# Patient Record
Sex: MALE | Race: WHITE | NOT HISPANIC OR LATINO
[De-identification: names, ages, dates, MRNs, and addresses within clinical notes are randomized per-mention and may not be internally consistent; named-entity substitution may affect disease eponyms.]

---

## 2018-06-05 PROBLEM — Z00.00 ENCOUNTER FOR PREVENTIVE HEALTH EXAMINATION: Status: ACTIVE | Noted: 2018-06-05

## 2018-06-08 ENCOUNTER — APPOINTMENT (OUTPATIENT)
Dept: PSYCHIATRY | Facility: CLINIC | Age: 18
End: 2018-06-08
Payer: COMMERCIAL

## 2018-06-08 PROCEDURE — 99205 OFFICE O/P NEW HI 60 MIN: CPT

## 2018-06-08 RX ORDER — HYDROCORTISONE 25 MG/G
2.5 CREAM TOPICAL
Qty: 2835 | Refills: 0 | Status: ACTIVE | COMMUNITY
Start: 2018-04-18

## 2018-06-11 LAB
ALBUMIN SERPL ELPH-MCNC: 4.7 G/DL
ALP BLD-CCNC: 53 U/L
ALT SERPL-CCNC: 14 U/L
ANION GAP SERPL CALC-SCNC: 14 MMOL/L
AST SERPL-CCNC: 21 U/L
BASOPHILS # BLD AUTO: 0.03 K/UL
BASOPHILS NFR BLD AUTO: 0.4 %
BILIRUB SERPL-MCNC: 0.5 MG/DL
BUN SERPL-MCNC: 14 MG/DL
CALCIUM SERPL-MCNC: 9.9 MG/DL
CHLORIDE SERPL-SCNC: 103 MMOL/L
CHOLEST SERPL-MCNC: 111 MG/DL
CHOLEST/HDLC SERPL: 2.6 RATIO
CO2 SERPL-SCNC: 26 MMOL/L
CREAT SERPL-MCNC: 1.03 MG/DL
EOSINOPHIL # BLD AUTO: 0.06 K/UL
EOSINOPHIL NFR BLD AUTO: 0.9 %
GLUCOSE SERPL-MCNC: 85 MG/DL
HBA1C MFR BLD HPLC: 4.8 %
HCT VFR BLD CALC: 46.5 %
HDLC SERPL-MCNC: 43 MG/DL
HGB BLD-MCNC: 15.7 G/DL
IMM GRANULOCYTES NFR BLD AUTO: 0.1 %
LDLC SERPL CALC-MCNC: 60 MG/DL
LYMPHOCYTES # BLD AUTO: 1.23 K/UL
LYMPHOCYTES NFR BLD AUTO: 17.5 %
MAN DIFF?: NORMAL
MCHC RBC-ENTMCNC: 31.2 PG
MCHC RBC-ENTMCNC: 33.8 GM/DL
MCV RBC AUTO: 92.4 FL
MONOCYTES # BLD AUTO: 0.53 K/UL
MONOCYTES NFR BLD AUTO: 7.5 %
NEUTROPHILS # BLD AUTO: 5.17 K/UL
NEUTROPHILS NFR BLD AUTO: 73.6 %
PLATELET # BLD AUTO: 277 K/UL
POTASSIUM SERPL-SCNC: 4.7 MMOL/L
PROT SERPL-MCNC: 7.4 G/DL
RBC # BLD: 5.03 M/UL
RBC # FLD: 12.6 %
SODIUM SERPL-SCNC: 143 MMOL/L
TRIGL SERPL-MCNC: 41 MG/DL
TSH SERPL-ACNC: 0.83 UIU/ML
WBC # FLD AUTO: 7.03 K/UL

## 2018-06-18 LAB — DRUG ABUSE PANEL-9, SERUM: NORMAL

## 2018-06-20 ENCOUNTER — APPOINTMENT (OUTPATIENT)
Dept: PSYCHIATRY | Facility: CLINIC | Age: 18
End: 2018-06-20
Payer: COMMERCIAL

## 2018-06-20 PROCEDURE — 99214 OFFICE O/P EST MOD 30 MIN: CPT

## 2018-07-13 ENCOUNTER — APPOINTMENT (OUTPATIENT)
Dept: PSYCHIATRY | Facility: CLINIC | Age: 18
End: 2018-07-13
Payer: COMMERCIAL

## 2018-07-13 PROCEDURE — 99214 OFFICE O/P EST MOD 30 MIN: CPT

## 2018-07-13 RX ORDER — QUETIAPINE FUMARATE 50 MG/1
50 TABLET ORAL
Qty: 30 | Refills: 0 | Status: DISCONTINUED | COMMUNITY
Start: 2018-06-08

## 2018-08-10 ENCOUNTER — APPOINTMENT (OUTPATIENT)
Dept: PSYCHIATRY | Facility: CLINIC | Age: 18
End: 2018-08-10
Payer: COMMERCIAL

## 2018-08-10 PROCEDURE — 99214 OFFICE O/P EST MOD 30 MIN: CPT

## 2018-09-21 ENCOUNTER — APPOINTMENT (OUTPATIENT)
Dept: PSYCHIATRY | Facility: CLINIC | Age: 18
End: 2018-09-21
Payer: COMMERCIAL

## 2018-09-21 PROCEDURE — 99214 OFFICE O/P EST MOD 30 MIN: CPT

## 2018-09-21 RX ORDER — SERTRALINE HYDROCHLORIDE 50 MG/1
50 TABLET, FILM COATED ORAL
Qty: 30 | Refills: 1 | Status: COMPLETED | COMMUNITY
Start: 2018-07-13 | End: 2018-09-21

## 2018-11-21 ENCOUNTER — APPOINTMENT (OUTPATIENT)
Dept: PSYCHIATRY | Facility: CLINIC | Age: 18
End: 2018-11-21

## 2018-11-29 ENCOUNTER — RX RENEWAL (OUTPATIENT)
Age: 18
End: 2018-11-29

## 2018-12-05 ENCOUNTER — APPOINTMENT (OUTPATIENT)
Dept: PSYCHIATRY | Facility: CLINIC | Age: 18
End: 2018-12-05
Payer: COMMERCIAL

## 2018-12-05 PROCEDURE — 99214 OFFICE O/P EST MOD 30 MIN: CPT

## 2019-02-04 ENCOUNTER — RX RENEWAL (OUTPATIENT)
Age: 19
End: 2019-02-04

## 2019-02-27 ENCOUNTER — APPOINTMENT (OUTPATIENT)
Dept: PSYCHIATRY | Facility: CLINIC | Age: 19
End: 2019-02-27
Payer: COMMERCIAL

## 2019-02-27 PROCEDURE — 99214 OFFICE O/P EST MOD 30 MIN: CPT

## 2019-03-22 ENCOUNTER — APPOINTMENT (OUTPATIENT)
Dept: PSYCHIATRY | Facility: CLINIC | Age: 19
End: 2019-03-22
Payer: COMMERCIAL

## 2019-03-22 PROCEDURE — 99214 OFFICE O/P EST MOD 30 MIN: CPT

## 2019-04-19 ENCOUNTER — APPOINTMENT (OUTPATIENT)
Dept: PSYCHIATRY | Facility: CLINIC | Age: 19
End: 2019-04-19

## 2019-05-15 ENCOUNTER — APPOINTMENT (OUTPATIENT)
Dept: PSYCHIATRY | Facility: CLINIC | Age: 19
End: 2019-05-15
Payer: COMMERCIAL

## 2019-05-15 PROCEDURE — 99214 OFFICE O/P EST MOD 30 MIN: CPT

## 2019-07-31 ENCOUNTER — RX RENEWAL (OUTPATIENT)
Age: 19
End: 2019-07-31

## 2019-08-26 ENCOUNTER — APPOINTMENT (OUTPATIENT)
Dept: PSYCHIATRY | Facility: CLINIC | Age: 19
End: 2019-08-26
Payer: COMMERCIAL

## 2019-08-26 PROCEDURE — 99214 OFFICE O/P EST MOD 30 MIN: CPT

## 2019-11-27 ENCOUNTER — APPOINTMENT (OUTPATIENT)
Dept: PSYCHIATRY | Facility: CLINIC | Age: 19
End: 2019-11-27
Payer: COMMERCIAL

## 2019-11-27 PROCEDURE — 99214 OFFICE O/P EST MOD 30 MIN: CPT

## 2019-12-31 ENCOUNTER — RX RENEWAL (OUTPATIENT)
Age: 19
End: 2019-12-31

## 2020-03-19 ENCOUNTER — APPOINTMENT (OUTPATIENT)
Dept: PSYCHIATRY | Facility: CLINIC | Age: 20
End: 2020-03-19
Payer: COMMERCIAL

## 2020-03-19 PROCEDURE — 99442: CPT

## 2020-03-19 RX ORDER — QUETIAPINE FUMARATE 100 MG/1
100 TABLET ORAL
Qty: 30 | Refills: 0 | Status: COMPLETED | COMMUNITY
Start: 2019-12-31 | End: 2020-03-19

## 2020-04-17 ENCOUNTER — APPOINTMENT (OUTPATIENT)
Dept: PSYCHIATRY | Facility: CLINIC | Age: 20
End: 2020-04-17
Payer: COMMERCIAL

## 2020-04-17 PROCEDURE — 99442: CPT

## 2020-07-24 ENCOUNTER — APPOINTMENT (OUTPATIENT)
Dept: PSYCHIATRY | Facility: CLINIC | Age: 20
End: 2020-07-24
Payer: COMMERCIAL

## 2020-07-24 PROCEDURE — 99214 OFFICE O/P EST MOD 30 MIN: CPT | Mod: 95

## 2020-09-12 ENCOUNTER — TRANSCRIPTION ENCOUNTER (OUTPATIENT)
Age: 20
End: 2020-09-12

## 2020-10-16 ENCOUNTER — APPOINTMENT (OUTPATIENT)
Dept: PSYCHIATRY | Facility: CLINIC | Age: 20
End: 2020-10-16
Payer: COMMERCIAL

## 2020-10-16 PROCEDURE — 99214 OFFICE O/P EST MOD 30 MIN: CPT

## 2020-12-11 ENCOUNTER — APPOINTMENT (OUTPATIENT)
Dept: PSYCHIATRY | Facility: CLINIC | Age: 20
End: 2020-12-11
Payer: COMMERCIAL

## 2020-12-11 PROCEDURE — 99214 OFFICE O/P EST MOD 30 MIN: CPT

## 2020-12-11 PROCEDURE — 99072 ADDL SUPL MATRL&STAF TM PHE: CPT

## 2021-02-12 ENCOUNTER — TRANSCRIPTION ENCOUNTER (OUTPATIENT)
Age: 21
End: 2021-02-12

## 2021-02-22 ENCOUNTER — TRANSCRIPTION ENCOUNTER (OUTPATIENT)
Age: 21
End: 2021-02-22

## 2021-03-12 ENCOUNTER — APPOINTMENT (OUTPATIENT)
Dept: PSYCHIATRY | Facility: CLINIC | Age: 21
End: 2021-03-12
Payer: COMMERCIAL

## 2021-03-12 PROCEDURE — 99214 OFFICE O/P EST MOD 30 MIN: CPT

## 2021-03-12 PROCEDURE — 99072 ADDL SUPL MATRL&STAF TM PHE: CPT

## 2021-03-12 RX ORDER — PANTOPRAZOLE 40 MG/1
40 TABLET, DELAYED RELEASE ORAL
Refills: 0 | Status: ACTIVE | COMMUNITY
Start: 2021-03-12

## 2021-04-14 ENCOUNTER — APPOINTMENT (OUTPATIENT)
Dept: PSYCHIATRY | Facility: CLINIC | Age: 21
End: 2021-04-14
Payer: COMMERCIAL

## 2021-04-14 PROCEDURE — 99214 OFFICE O/P EST MOD 30 MIN: CPT | Mod: 95

## 2021-06-04 ENCOUNTER — APPOINTMENT (OUTPATIENT)
Dept: PSYCHIATRY | Facility: CLINIC | Age: 21
End: 2021-06-04
Payer: COMMERCIAL

## 2021-06-04 PROCEDURE — 99214 OFFICE O/P EST MOD 30 MIN: CPT | Mod: 95

## 2021-08-06 ENCOUNTER — APPOINTMENT (OUTPATIENT)
Dept: PSYCHIATRY | Facility: CLINIC | Age: 21
End: 2021-08-06
Payer: COMMERCIAL

## 2021-08-06 PROCEDURE — 99214 OFFICE O/P EST MOD 30 MIN: CPT | Mod: 95

## 2021-10-01 ENCOUNTER — APPOINTMENT (OUTPATIENT)
Dept: PSYCHIATRY | Facility: CLINIC | Age: 21
End: 2021-10-01
Payer: COMMERCIAL

## 2021-10-01 PROCEDURE — 99214 OFFICE O/P EST MOD 30 MIN: CPT | Mod: 95

## 2021-10-06 ENCOUNTER — TRANSCRIPTION ENCOUNTER (OUTPATIENT)
Age: 21
End: 2021-10-06

## 2021-10-20 ENCOUNTER — TRANSCRIPTION ENCOUNTER (OUTPATIENT)
Age: 21
End: 2021-10-20

## 2021-11-03 ENCOUNTER — TRANSCRIPTION ENCOUNTER (OUTPATIENT)
Age: 21
End: 2021-11-03

## 2021-12-07 ENCOUNTER — TRANSCRIPTION ENCOUNTER (OUTPATIENT)
Age: 21
End: 2021-12-07

## 2021-12-17 ENCOUNTER — APPOINTMENT (OUTPATIENT)
Dept: PSYCHIATRY | Facility: CLINIC | Age: 21
End: 2021-12-17
Payer: COMMERCIAL

## 2021-12-17 PROCEDURE — 99214 OFFICE O/P EST MOD 30 MIN: CPT | Mod: 95

## 2022-01-14 ENCOUNTER — APPOINTMENT (OUTPATIENT)
Dept: PSYCHIATRY | Facility: CLINIC | Age: 22
End: 2022-01-14
Payer: COMMERCIAL

## 2022-01-14 PROCEDURE — 99214 OFFICE O/P EST MOD 30 MIN: CPT | Mod: 95

## 2022-01-18 ENCOUNTER — TRANSCRIPTION ENCOUNTER (OUTPATIENT)
Age: 22
End: 2022-01-18

## 2022-03-11 ENCOUNTER — APPOINTMENT (OUTPATIENT)
Dept: PSYCHIATRY | Facility: CLINIC | Age: 22
End: 2022-03-11
Payer: COMMERCIAL

## 2022-03-11 ENCOUNTER — NON-APPOINTMENT (OUTPATIENT)
Age: 22
End: 2022-03-11

## 2022-03-11 PROCEDURE — 99214 OFFICE O/P EST MOD 30 MIN: CPT | Mod: 95

## 2022-03-11 RX ORDER — ESCITALOPRAM OXALATE 5 MG/1
5 TABLET ORAL
Qty: 90 | Refills: 0 | Status: DISCONTINUED | COMMUNITY
Start: 2021-03-12 | End: 2022-03-11

## 2022-06-03 ENCOUNTER — APPOINTMENT (OUTPATIENT)
Dept: PSYCHIATRY | Facility: CLINIC | Age: 22
End: 2022-06-03
Payer: COMMERCIAL

## 2022-06-03 PROCEDURE — 99214 OFFICE O/P EST MOD 30 MIN: CPT | Mod: 95

## 2022-06-03 RX ORDER — MELOXICAM 7.5 MG/1
7.5 TABLET ORAL
Qty: 14 | Refills: 0 | Status: DISCONTINUED | COMMUNITY
Start: 2022-04-14

## 2022-06-03 RX ORDER — DOXYCYCLINE HYCLATE 100 MG/1
100 TABLET ORAL
Qty: 14 | Refills: 0 | Status: DISCONTINUED | COMMUNITY
Start: 2022-03-23

## 2022-06-03 RX ORDER — DOXYCYCLINE HYCLATE 100 MG/1
100 CAPSULE ORAL
Qty: 14 | Refills: 0 | Status: DISCONTINUED | COMMUNITY
Start: 2022-04-14

## 2022-08-23 ENCOUNTER — APPOINTMENT (OUTPATIENT)
Dept: PSYCHIATRY | Facility: CLINIC | Age: 22
End: 2022-08-23

## 2022-08-23 PROCEDURE — 99214 OFFICE O/P EST MOD 30 MIN: CPT

## 2022-10-18 ENCOUNTER — APPOINTMENT (OUTPATIENT)
Dept: PSYCHIATRY | Facility: CLINIC | Age: 22
End: 2022-10-18

## 2022-10-18 PROCEDURE — 99214 OFFICE O/P EST MOD 30 MIN: CPT

## 2022-10-18 RX ORDER — DICLOFENAC SODIUM 75 MG/1
75 TABLET, DELAYED RELEASE ORAL
Qty: 60 | Refills: 0 | Status: DISCONTINUED | COMMUNITY
Start: 2022-07-05

## 2022-10-23 NOTE — PLAN
[No] : No [Medication education provided] : Medication education provided. [Rationale for medication choices, possible risks/precautions, benefits, alternative treatment choices, and consequences of non-treatment discussed] : Rationale for medication choices, possible risks/precautions, benefits, alternative treatment choices, and consequences of non-treatment discussed with patient/family/caregiver  [FreeTextEntry5] : Psychoeducation and supportive therapy provided, discussed risk vs benefits of continuing med and risk of relapse when not on meds. \par Reduce Seroquel to 50 mg/day\par Continue individual therapy (outside). \par Educated patient of importance of being abstinent from drugs and alcohol. \par  Emergency procedures were discussed: pt. educated to call 911 or go to nearest ER for worsening of symptoms/suicidal/homicidal ideation.\par RTC in 6-8 weeks or earlier as needed  \par  Patient given opportunity to ask questions and his questions were answered and he expressed understanding and agreement with recommendations.

## 2022-10-23 NOTE — DISCUSSION/SUMMARY
[FreeTextEntry1] :  \par On initial eval: The patient is a 17 yo male with with episodes of major depression and hypomania, consistent with diagnosis of bipolar disorder type 2, depressive disorder. He also reports persisting excessive worrying about relationship stability and compulsively checking for reassurances, consistent OCD. \par \par The patient continues to remain stable with no sx of depression, hypomania and or anxiety/OCD since lowering quetiapine dose and as had not been able to lose weight and concerned about metabolic side effects from Seroquel, he wants to continue slower taper to come off Seroquel. He is agreeable to go to the previous dose of quetiapine if he is expericning any mood changes or relapse of depression or hypomania symptoms. Alternately we discussed adding lamotrigine for bipolar maintenance and then coming off Seroquel or to consider switching to lamotrigine if he were to experience any mood instability when he reduces the Seroquel dose.\par

## 2022-10-23 NOTE — HISTORY OF PRESENT ILLNESS
[de-identified] : Patient reports he is doing well since last visit. \par Reports no sx of depression and or anxiety and he has not experienced intrusive and obsessive thoughts since last visit. \par He reports sustained motivation and desire to do things.He is playing in a soccer team. \par He states work is very busy and he is coping with stress well. He states he was promoted to , enjoying his work. \par No sx of hypomania since last visit. \par He denies feeling hopeless and helpless and denies passive/active SI. \par Patient reports adherence with medication. Patient reports sleep and appetite are good.\par He states ETOH use is minimal, once a week\par He denies using cannabis or any other illicit drugs\par No new medical issues, no new medication since last visit\par \par Patient reports that he has not been able to lose weight and concerned about metabolic side effects from Seroquel, wants to come off Seroquel. Informed patient that he may have a higher risk of relapse of mood symptoms because of family history and his diagnosis of bipolar disorder, and during the tapering process if he were to experience any mood changes or relapse of depression or hypomania symptoms he should continue with Seroquel 100 mg daily.  Alternately we discussed adding lamotrigine for bipolar maintenance and then coming off Seroquel or to consider switching to lamotrigine if he were to experience any mood instability when he reduces the Seroquel dose.\par  [FreeTextEntry1] : The patient reported he did not experience any change with reducing quetiapine dose to 75 mg HS, and denied sx of depression, hypomania, and or anxiety and he has not experienced intrusive and obsessive thoughts since last visit. \par He reports sustained motivation and desire to do things.He is continuing to playing in a soccer team. \par He states work is very busy since he was promoted and he is coping with stress well. \par He denied feeling hopeless and helpless and denied passive/active SI. \par Patient reported adherence with medication. Patient reported sleep and appetite are good.\par He states ETOH use is minimal, once a week\par He denies using cannabis or any other illicit drugs\par No new medical issues, no new medication since last visit\par \par

## 2023-01-03 ENCOUNTER — APPOINTMENT (OUTPATIENT)
Dept: PSYCHIATRY | Facility: CLINIC | Age: 23
End: 2023-01-03

## 2023-01-04 ENCOUNTER — APPOINTMENT (OUTPATIENT)
Dept: PSYCHIATRY | Facility: CLINIC | Age: 23
End: 2023-01-04
Payer: COMMERCIAL

## 2023-01-04 DIAGNOSIS — M10.9 GOUT, UNSPECIFIED: ICD-10-CM

## 2023-01-04 PROCEDURE — 99214 OFFICE O/P EST MOD 30 MIN: CPT | Mod: 95

## 2023-01-04 RX ORDER — INDOMETHACIN 50 MG/1
50 CAPSULE ORAL
Qty: 60 | Refills: 0 | Status: DISCONTINUED | COMMUNITY
Start: 2022-12-08

## 2023-01-04 NOTE — DISCUSSION/SUMMARY
[FreeTextEntry1] :  \par On initial eval: The patient is a 17 yo male with with episodes of major depression and hypomania, consistent with diagnosis of bipolar disorder type 2, depressive disorder. He also reports persisting excessive worrying about relationship stability and compulsively checking for reassurances, consistent OCD. \par \par The patient remains stable with no sx of depression, hypomania and or anxiety/OCD since lowering quetiapine dose to 50 mg HS, he wants to continue slower taper to come off Seroquel. He is agreeable to go to the previous dose of quetiapine if he is experiencing any mood changes or relapse of depression or hypomania symptoms.\par \par

## 2023-01-04 NOTE — PLAN
[FreeTextEntry5] : Psychoeducation and supportive therapy provided, discussed risk vs benefits of continuing med and risk of relapse when not on meds. \par Reduce Seroquel to 25 mg/day\par Continue individual therapy (outside). \par Educated patient of importance of being abstinent from drugs and alcohol. \par  Emergency procedures were discussed: pt. educated to call 911 or go to nearest ER for worsening of symptoms/suicidal/homicidal ideation.\par RTC in 2 months or earlier as needed  \par  Patient given opportunity to ask questions and his questions were answered and he expressed understanding and agreement with recommendations.

## 2023-01-04 NOTE — HISTORY OF PRESENT ILLNESS
[Other Location: e.g. School (Enter Location, City,State)___] : at [unfilled], at the time of the visit. [Medical Office: ([facility])___] : at the medical office located in  [Verbal consent obtained from patient] : the patient, [unfilled] [FreeTextEntry1] : The patient reported he is doing well since last visit and feels that lowering the Seroquel dose to 50 mg as continued to help keep his mood stable and he has not noticed any issues with his sleep.  Patient reported that he has moved into an apartment that he is renting in [place] since last visit and he likes the new apartment has more sunlight during the day as he was living in the basement in his childhood home\par He denied sx of depression, hypomania, and or anxiety and he has not experienced intrusive and obsessive thoughts since last visit. \par He reports sustained motivation and desire to do things.\par He is continuing to playing in a soccer team. \par He denied feeling hopeless and helpless and denied passive/active SI. \par Patient reported adherence with medication. \par He states ETOH use is minimal, once a week\par He denies using cannabis or any other illicit drugs\par Reported that he was diagnosed with mild gout for which he needed to take a short course of anti-inflammatory medication since last visit \par \par

## 2023-06-07 ENCOUNTER — APPOINTMENT (OUTPATIENT)
Dept: PSYCHIATRY | Facility: CLINIC | Age: 23
End: 2023-06-07
Payer: COMMERCIAL

## 2023-06-07 PROCEDURE — 99214 OFFICE O/P EST MOD 30 MIN: CPT | Mod: 95

## 2023-06-07 NOTE — DISCUSSION/SUMMARY
[FreeTextEntry1] : On initial eval: The patient is a 19 yo male with with episodes of major depression and hypomania, consistent with diagnosis of bipolar disorder type 2, depressive disorder. He also reports persisting excessive worrying about relationship stability and compulsively checking for reassurances, consistent OCD. \par \par The patient continues to remain stable with no sx of depression, hypomania and or anxiety/OCD since lowering quetiapine dose to 25 mg HS, he wants to continue slower taper to come off Seroquel. He is agreeable to go to the previous dose of quetiapine if he is experiencing any mood changes or relapse of depression or hypomania symptoms.\par \par

## 2023-06-07 NOTE — REASON FOR VISIT
[Patient] : Patient [Patient preference] : as per patient preference [Continuity of care] : to ensure continuity of care [Telehealth (audio & video) - Individual/Group] : This visit was provided via telehealth using real-time 2-way audio visual technology. [Medical Office: ([facility])___] : The provider was located at the medical office in [unfilled]. [Other Location: e.g. Home (Enter Location, City,State)___] : The patient, [unfilled], was located at [unfilled] at the time of the visit. [Verbal consent obtained from patient/other participant(s)] : Verbal consent for telehealth/telephonic services obtained from patient/other participant(s) [FreeTextEntry1] : bipolar disorder, OCD

## 2023-06-07 NOTE — PLAN
[No] : No [Medication education provided] : Medication education provided. [Rationale for medication choices, possible risks/precautions, benefits, alternative treatment choices, and consequences of non-treatment discussed] : Rationale for medication choices, possible risks/precautions, benefits, alternative treatment choices, and consequences of non-treatment discussed with patient/family/caregiver  [FreeTextEntry5] : Psychoeducation and supportive therapy provided, discussed risk vs benefits of continuing med and risk of relapse when not on meds. \par Continue Seroquel 25 mg/day\par Continue individual therapy (outside). \par Educated patient of importance of being abstinent from drugs and alcohol. \par  Emergency procedures were discussed: pt. educated to call 911 or go to nearest ER for worsening of symptoms/suicidal/homicidal ideation.\par RTC in 3 months or earlier as needed  \par  Patient given opportunity to ask questions and his questions were answered and he expressed understanding and agreement with recommendations.

## 2023-06-07 NOTE — HISTORY OF PRESENT ILLNESS
[Other Location: e.g. School (Enter Location, City,State)___] : at [unfilled], at the time of the visit. [Medical Office: ([facility])___] : at the medical office located in  [Verbal consent obtained from patient] : the patient, [unfilled] [FreeTextEntry1] : The patient reported he is continuing to do well since last visi.  Patient states that he stayed on Seroquel 25 mg at bedtime.  Patient states he did not want to go off the medication completely.  Patient reported no relapse of symptoms of depression, terence or hypomania and that he is sleeping well but does not also have any side effects from the medications so he felt comfortable continuing the low-dose Seroquel and monitor for continued stability of his mood.  Patient reported that he is in a new relationship and moving to the city and living in a rental apartment of his own has been "a lifestyle change" and that he feels that he is adjusting to that well.  Patient states that he started noticing he was getting slight intrusive thoughts about the relationship as he had previously so he started seeing the therapist.  Patient states that this time around he has been able to manage these intrusive thoughts a lot better and feels that therapy is also helping him reframe his thought processes around the intrusive thoughts about the relationship.  Patient reported that he is drinking less since he moved to the city and also eating healthy cooking more at home. \par He reports sustained motivation and desire to do things.\par He denied feeling hopeless and helpless and denied passive/active SI. \par Patient reported adherence with medication. \par He states ETOH use is minimal, once a week\par He denies using cannabis or any other illicit drugs\par No new medical issues, no new medication since last visit\par \par

## 2023-09-05 ENCOUNTER — APPOINTMENT (OUTPATIENT)
Dept: ORTHOPEDIC SURGERY | Facility: CLINIC | Age: 23
End: 2023-09-05
Payer: COMMERCIAL

## 2023-09-05 VITALS — BODY MASS INDEX: 27.2 KG/M2 | HEIGHT: 70 IN | WEIGHT: 190 LBS | RESPIRATION RATE: 16 BRPM

## 2023-09-05 DIAGNOSIS — Z78.9 OTHER SPECIFIED HEALTH STATUS: ICD-10-CM

## 2023-09-05 PROCEDURE — 29085 APPL CAST HAND&LWR FOREARM: CPT | Mod: RT

## 2023-09-05 PROCEDURE — 73140 X-RAY EXAM OF FINGER(S): CPT | Mod: F5

## 2023-09-05 PROCEDURE — 99204 OFFICE O/P NEW MOD 45 MIN: CPT | Mod: 25

## 2023-09-05 NOTE — PHYSICAL EXAM
[de-identified] : Physical exam demonstrates the patient to be alert and oriented x 3 and capable of ambulation. The patient is well-developed and well-nourished in no apparent respiratory distress. The majority of the skin is intact bilaterally in the upper extremities without any bilateral elbow lymphadenopathy.  Evaluation of both elbows reveals full symmetric range of motion from full extension to 140 of flexion with full pronation and full supination.   The wrists have symmetric range of motion bilaterally. There is no tenderness over the scaphoid, scapholunate, or lunotriquetral ligaments bilaterally. There is a negative Stubbs's test bilaterally. There is no tenderness over the distal radial ulnar joint or TFCC and no evidence of instability bilaterally. There is no tenderness over the pisotriquetral joint, hamate hook, or CMC joints bilaterally. The patient is nontender over both scaphoids and anatomic snuffbox is bilaterally. There is no clubbing cyanosis or edema.  Full, symmetric digital ROM with mild difficulty in terminate flexing the right thumb MCP joint.  Nontender over the right thumb MCP ulnar collateral ligament and volar plate, tender over the dorsal capsule and to a greater degree over the RCL origin.  There is increased laxity upon stress examination of the right thumb radial collateral ligament (ulnar deviation stress in both extension and 30 degrees flexion) but firm endpoint appreciated.  No instability noted upon radial deviation stress testing, assessing the UCL.  There is good capillary refill of the digits bilaterally.  Sensation is intact to light touch bilaterally. [de-identified] : PA, oblique and lateral x-rays of the right thumb were obtained today to assess for bony injury.  There is mild volar translation of the proximal phalanx over the thumb metacarpal head; no dislocation.  No appreciation of acute fracture.

## 2023-09-05 NOTE — ASSESSMENT
[FreeTextEntry1] : My impression is that the patient has a right thumb MCP radial collateral ligament injury, most likely partial tear.  I did explain the possibility of a complete rupture given his radiographic findings and increased laxity, however, I explained that the firm endpoint pointed towards a stable joint.  I recommended full-time immobilization in a thumb spica cast while also obtaining an MRI to better evaluate the radial collateral ligament.  I explained that if a partial tear is further supported by the MRI findings, nonoperative treatment with full-time immobilization and then rehabilitation would be a reasonable option to allow the ligament to heal.  I explained that if the MRI demonstrates a complete, full-thickness and displaced tear, surgery will be considered and discussed in more detail.  He was well in accordance with the plan.

## 2023-09-05 NOTE — PROCEDURE
[FreeTextEntry1] : A well-padded and molded hand-based thumb spica cast was formulated and applied to the right hand today.

## 2023-09-05 NOTE — HISTORY OF PRESENT ILLNESS
[Right] : right hand dominant [FreeTextEntry1] : DOI: 8/19/23 (2 weeks 3 days)  Patient presents for an evaluation of Radial sided right thumb MCP joint pain 17 days status post jamming it while playing basketball. The patient c/o radial sided MP joint pain of the right thumb with flexion. He has worn a splint for 1.5 weeks with no history of formal treatment. The patient notes splint did not help too much. No recollection of any dislocation event.  He does note some swelling and bruising when the injury first happened.

## 2023-09-07 ENCOUNTER — APPOINTMENT (OUTPATIENT)
Dept: ORTHOPEDIC SURGERY | Facility: CLINIC | Age: 23
End: 2023-09-07
Payer: COMMERCIAL

## 2023-09-07 ENCOUNTER — NON-APPOINTMENT (OUTPATIENT)
Age: 23
End: 2023-09-07

## 2023-09-07 PROCEDURE — 99449 NTRPROF PH1/NTRNET/EHR 31/>: CPT

## 2023-09-08 NOTE — ASSESSMENT
[FreeTextEntry1] : I had a lengthy discussion with the patient today explaining his right thumb MRI findings which were consistent with a bone contusion and strain of the APB without evidence of any complete disruption of the supportive collateral ligaments.  I explained that his examination findings, particularly a firm endpoint upon stress examination, were consistent with the absence of a complete radial collateral ligament tear and associated instability.  I recommended 6 weeks of full-time immobilization in a thumb spica cast, which was placed 2 days ago during his evaluation, followed by hand therapy to regain his motion and strength once the MCP joint demonstrates healing.  He was well in accordance with this plan and will follow up with me, as directed.  All questions were answered.

## 2023-09-08 NOTE — HISTORY OF PRESENT ILLNESS
[FreeTextEntry1] : I called the patient today to discuss his recently obtained right thumb MRI findings from 9/7/2023.

## 2023-09-20 ENCOUNTER — APPOINTMENT (OUTPATIENT)
Dept: ORTHOPEDIC SURGERY | Facility: CLINIC | Age: 23
End: 2023-09-20
Payer: COMMERCIAL

## 2023-09-20 PROCEDURE — 99213 OFFICE O/P EST LOW 20 MIN: CPT

## 2023-10-19 ENCOUNTER — APPOINTMENT (OUTPATIENT)
Dept: ORTHOPEDIC SURGERY | Facility: CLINIC | Age: 23
End: 2023-10-19
Payer: COMMERCIAL

## 2023-10-19 DIAGNOSIS — S69.91XD UNSPECIFIED INJURY OF RIGHT WRIST, HAND AND FINGER(S), SUBSEQUENT ENCOUNTER: ICD-10-CM

## 2023-10-19 PROCEDURE — 99213 OFFICE O/P EST LOW 20 MIN: CPT

## 2023-10-24 ENCOUNTER — APPOINTMENT (OUTPATIENT)
Dept: PSYCHIATRY | Facility: CLINIC | Age: 23
End: 2023-10-24
Payer: COMMERCIAL

## 2023-10-24 DIAGNOSIS — F41.9 ANXIETY DISORDER, UNSPECIFIED: ICD-10-CM

## 2023-10-24 PROCEDURE — 99214 OFFICE O/P EST MOD 30 MIN: CPT | Mod: 95

## 2024-01-24 ENCOUNTER — APPOINTMENT (OUTPATIENT)
Dept: PSYCHIATRY | Facility: CLINIC | Age: 24
End: 2024-01-24
Payer: COMMERCIAL

## 2024-01-24 PROCEDURE — 99214 OFFICE O/P EST MOD 30 MIN: CPT | Mod: 95

## 2024-01-28 NOTE — HISTORY OF PRESENT ILLNESS
[FreeTextEntry1] : The patient reported that he is "generally doing good".  Patient states that he has been seeing his therapist regularly and feels that the therapy is helping him manage his anxiety symptoms and OCD symptoms and since he has been doing well the therapist and the patient decided to spread out the visits to once a month.  The patient reported that his work is going okay if his holidays were slow and it started to  again.  Patient reported no symptoms of depression, terence or hypomania since last visit and reported that he is sleeping well and his appetite is good.  He denied feeling hopeless and helpless and denied passive/active SI.  Patient states the alcohol consumption is much less and while he was in college.  And he denied any illicit drug use. Patient reported adherence with medication.  No new medical issues, no new medication since last visit

## 2024-01-28 NOTE — REASON FOR VISIT
[Patient preference] : as per patient preference [Continuity of care] : to ensure continuity of care [Telehealth (audio & video) - Individual/Group] : This visit was provided via telehealth using real-time 2-way audio visual technology. [Medical Office: ([facility])___] : The provider was located at the medical office in [unfilled]. [Other Location: e.g. Home (Enter Location, City,State)___] : The patient, [unfilled], was located at [unfilled] at the time of the visit. [Verbal consent obtained from patient/other participant(s)] : Verbal consent for telehealth/telephonic services obtained from patient/other participant(s) [Patient] : Patient [FreeTextEntry1] : bipolar disorder, OCD

## 2024-01-28 NOTE — DISCUSSION/SUMMARY
[FreeTextEntry1] : On initial eval: The patient is a 17 yo male with with episodes of major depression and hypomania, consistent with diagnosis of bipolar disorder type 2, depressive disorder. He also reports persisting excessive worrying about relationship stability and compulsively checking for reassurances, consistent OCD.   The patient remains stable with no sx of depression, hypomania and or anxiety/OCD since last visit, he wants to continue lower dose of quetiapine, revisit further dose reduction in Spring.

## 2024-01-28 NOTE — PLAN
[No] : No [Medication education provided] : Medication education provided. [Rationale for medication choices, possible risks/precautions, benefits, alternative treatment choices, and consequences of non-treatment discussed] : Rationale for medication choices, possible risks/precautions, benefits, alternative treatment choices, and consequences of non-treatment discussed with patient/family/caregiver  [FreeTextEntry5] : Psychoeducation and supportive therapy provided, discussed risk vs benefits of continuing med and risk of relapse when not on meds.  Continue Seroquel 25 mg/day Continue individual therapy (outside).  Educated patient of importance of being abstinent from drugs and alcohol.  Emergency procedures were discussed: pt. educated to call 911 or go to nearest ER for worsening of symptoms/suicidal/homicidal ideation. RTC in 3 months or earlier as needed   Patient given opportunity to ask questions and his questions were answered and he expressed understanding and agreement with recommendations.

## 2024-04-17 ENCOUNTER — APPOINTMENT (OUTPATIENT)
Dept: PSYCHIATRY | Facility: CLINIC | Age: 24
End: 2024-04-17
Payer: COMMERCIAL

## 2024-04-17 DIAGNOSIS — F31.81 BIPOLAR II DISORDER: ICD-10-CM

## 2024-04-17 DIAGNOSIS — F42.9 OBSESSIVE-COMPULSIVE DISORDER, UNSPECIFIED: ICD-10-CM

## 2024-04-17 PROCEDURE — G2211 COMPLEX E/M VISIT ADD ON: CPT | Mod: 95

## 2024-04-17 PROCEDURE — 99214 OFFICE O/P EST MOD 30 MIN: CPT | Mod: 95

## 2024-04-17 RX ORDER — QUETIAPINE FUMARATE 25 MG/1
25 TABLET ORAL
Qty: 15 | Refills: 1 | Status: ACTIVE | COMMUNITY
Start: 2018-06-08 | End: 1900-01-01

## 2024-04-26 NOTE — PLAN
[No] : No [Medication education provided] : Medication education provided. [Rationale for medication choices, possible risks/precautions, benefits, alternative treatment choices, and consequences of non-treatment discussed] : Rationale for medication choices, possible risks/precautions, benefits, alternative treatment choices, and consequences of non-treatment discussed with patient/family/caregiver  [FreeTextEntry5] : Psychoeducation and supportive therapy provided, discussed risk vs benefits of continuing med and risk of relapse when not on meds and rationale for lowering and stopping Seroquel and monitor stability.  Continue Seroquel 12.5 mg HS for 3-4 weeks then every other night for 2 weeks then stop.  Continue individual therapy (outside).  Educated patient of importance of being abstinent from drugs and alcohol.  Emergency procedures were discussed: pt. educated to call 911 or go to nearest ER for worsening of symptoms/suicidal/homicidal ideation. RTC in 3 months or earlier as needed.   Patient given opportunity to ask questions and his questions were answered and he expressed understanding and agreement with recommendations.

## 2024-04-26 NOTE — DISCUSSION/SUMMARY
[FreeTextEntry1] : On initial eval: The patient is a 19 yo male with with episodes of major depression and hypomania, consistent with diagnosis of bipolar disorder type 2, depressive disorder. He also reports persisting excessive worrying about relationship stability and compulsively checking for reassurances, consistent OCD.   The patient continues to remain stable with no sx of depression, hypomania and or anxiety/OCD since last visit, and would like to continue lower dose of quetiapine and stop it and monitor for reemergence of symptoms.

## 2024-04-26 NOTE — REASON FOR VISIT
[Patient preference] : as per patient preference [Continuity of care] : to ensure continuity of care [Telehealth (audio & video) - Individual/Group] : This visit was provided via telehealth using real-time 2-way audio visual technology. [Other Location: e.g. Home (Enter Location, City,State)___] : The patient, [unfilled], was located at [unfilled] at the time of the visit. [Verbal consent obtained from patient/other participant(s)] : Verbal consent for telehealth/telephonic services obtained from patient/other participant(s) [Patient] : Patient [FreeTextEntry1] : bipolar disorder, OCD

## 2024-04-26 NOTE — HISTORY OF PRESENT ILLNESS
[FreeTextEntry1] : The patient reported that he is "doing good" since last visit.  Patient states that he is doing well with spacing out therapy sessions to once a week and is able to continue to manage his anxiety symptoms and OCD symptoms. Patient reported no symptoms of depression, terence or hypomania since last visit and reported that he is sleeping well, and his appetite is good.  He denied feeling hopeless and helpless and denied passive/active SI.  Patient states the alcohol consumption is much less and while he was in college.  And he denied any illicit drug use. Patient reported adherence with medication.  No new medical issues, no new medication since last visit

## 2024-07-17 ENCOUNTER — APPOINTMENT (OUTPATIENT)
Dept: PSYCHIATRY | Facility: CLINIC | Age: 24
End: 2024-07-17
Payer: SELF-PAY

## 2024-07-17 DIAGNOSIS — F42.9 OBSESSIVE-COMPULSIVE DISORDER, UNSPECIFIED: ICD-10-CM

## 2024-07-17 DIAGNOSIS — F31.81 BIPOLAR II DISORDER: ICD-10-CM

## 2024-07-17 PROCEDURE — 99214 OFFICE O/P EST MOD 30 MIN: CPT | Mod: 95

## 2024-07-17 PROCEDURE — G2211 COMPLEX E/M VISIT ADD ON: CPT | Mod: 95
